# Patient Record
Sex: FEMALE | Race: WHITE | NOT HISPANIC OR LATINO | URBAN - METROPOLITAN AREA
[De-identification: names, ages, dates, MRNs, and addresses within clinical notes are randomized per-mention and may not be internally consistent; named-entity substitution may affect disease eponyms.]

---

## 2017-09-23 ENCOUNTER — OUTPATIENT (OUTPATIENT)
Dept: OUTPATIENT SERVICES | Facility: HOSPITAL | Age: 52
LOS: 1 days | End: 2017-09-23
Payer: COMMERCIAL

## 2017-09-23 ENCOUNTER — APPOINTMENT (OUTPATIENT)
Dept: RADIOLOGY | Facility: CLINIC | Age: 52
End: 2017-09-23
Payer: COMMERCIAL

## 2017-09-23 DIAGNOSIS — Z00.8 ENCOUNTER FOR OTHER GENERAL EXAMINATION: ICD-10-CM

## 2017-09-23 PROCEDURE — 71020: CPT | Mod: 26

## 2017-09-23 PROCEDURE — 71046 X-RAY EXAM CHEST 2 VIEWS: CPT

## 2018-06-13 ENCOUNTER — RESULT REVIEW (OUTPATIENT)
Age: 53
End: 2018-06-13

## 2019-05-04 ENCOUNTER — APPOINTMENT (OUTPATIENT)
Dept: CT IMAGING | Facility: CLINIC | Age: 54
End: 2019-05-04
Payer: COMMERCIAL

## 2019-05-04 ENCOUNTER — OUTPATIENT (OUTPATIENT)
Dept: OUTPATIENT SERVICES | Facility: HOSPITAL | Age: 54
LOS: 1 days | End: 2019-05-04
Payer: COMMERCIAL

## 2019-05-04 DIAGNOSIS — Z00.8 ENCOUNTER FOR OTHER GENERAL EXAMINATION: ICD-10-CM

## 2019-05-04 PROCEDURE — 71250 CT THORAX DX C-: CPT | Mod: 26

## 2019-05-04 PROCEDURE — 71250 CT THORAX DX C-: CPT

## 2019-09-02 ENCOUNTER — EMERGENCY (EMERGENCY)
Facility: HOSPITAL | Age: 54
LOS: 0 days | Discharge: HOME | End: 2019-09-02
Attending: EMERGENCY MEDICINE
Payer: COMMERCIAL

## 2019-09-02 VITALS
SYSTOLIC BLOOD PRESSURE: 142 MMHG | RESPIRATION RATE: 18 BRPM | OXYGEN SATURATION: 100 % | HEART RATE: 72 BPM | WEIGHT: 110.01 LBS | DIASTOLIC BLOOD PRESSURE: 83 MMHG | TEMPERATURE: 98 F | HEIGHT: 63 IN

## 2019-09-02 DIAGNOSIS — Z88.0 ALLERGY STATUS TO PENICILLIN: ICD-10-CM

## 2019-09-02 DIAGNOSIS — S00.33XA CONTUSION OF NOSE, INITIAL ENCOUNTER: ICD-10-CM
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
## 2019-09-02 DIAGNOSIS — S00.12XA CONTUSION OF LEFT EYELID AND PERIOCULAR AREA, INITIAL ENCOUNTER: ICD-10-CM

## 2019-09-02 DIAGNOSIS — S00.83XA CONTUSION OF OTHER PART OF HEAD, INITIAL ENCOUNTER: ICD-10-CM

## 2019-09-02 DIAGNOSIS — S01.112A LACERATION WITHOUT FOREIGN BODY OF LEFT EYELID AND PERIOCULAR AREA, INITIAL ENCOUNTER: ICD-10-CM

## 2019-09-02 DIAGNOSIS — M81.0 AGE-RELATED OSTEOPOROSIS WITHOUT CURRENT PATHOLOGICAL FRACTURE: ICD-10-CM

## 2019-09-02 DIAGNOSIS — Z88.1 ALLERGY STATUS TO OTHER ANTIBIOTIC AGENTS STATUS: ICD-10-CM

## 2019-09-02 PROCEDURE — 99283 EMERGENCY DEPT VISIT LOW MDM: CPT

## 2019-09-02 PROCEDURE — 90715 TDAP VACCINE 7 YRS/> IM: CPT

## 2019-09-02 PROCEDURE — 90471 IMMUNIZATION ADMIN: CPT

## 2019-09-02 PROCEDURE — 99283 EMERGENCY DEPT VISIT LOW MDM: CPT | Mod: 25

## 2019-09-02 RX ORDER — TETANUS TOXOID, REDUCED DIPHTHERIA TOXOID AND ACELLULAR PERTUSSIS VACCINE, ADSORBED 5; 2.5; 8; 8; 2.5 [IU]/.5ML; [IU]/.5ML; UG/.5ML; UG/.5ML; UG/.5ML
0.5 SUSPENSION INTRAMUSCULAR ONCE
Refills: 0 | Status: COMPLETED | OUTPATIENT
Start: 2019-09-02 | End: 2019-09-02

## 2019-09-02 RX ADMIN — TETANUS TOXOID, REDUCED DIPHTHERIA TOXOID AND ACELLULAR PERTUSSIS VACCINE, ADSORBED 0.5 MILLILITER(S): 5; 2.5; 8; 8; 2.5 SUSPENSION INTRAMUSCULAR at 15:23

## 2019-09-02 NOTE — ED STATDOCS - ATTENDING CONTRIBUTION TO CARE
I, Nicolas Flannery MD,  performed the initial face to face bedside interview with this patient regarding history of present illness, review of symptoms and relevant past medical, social and family history.  I completed an independent physical examination.  I was the initial provider who evaluated this patient. I have signed out the follow up of any pending tests (i.e. labs, radiological studies) to the ACP.  I have communicated the patient’s plan of care and disposition with the ACP.

## 2019-09-02 NOTE — ED ADULT TRIAGE NOTE - CHIEF COMPLAINT QUOTE
Patient comes to ED for hitting face while using an abdominal roller on the floor. pt denies LOC. pt denies blood thinners. pt denies any pain. pt has redness and ecchymosis to left eye

## 2019-09-02 NOTE — ED STATDOCS - OBJECTIVE STATEMENT
Pertinent HPI/PMH/PSH/FHx/SHx and Review of Systems contained within  HPI: 53 y/o female p/w CC ecchymosis around and swelling around left eye. Pt states she was using an abdominal roller last night and slipped and hit her face against the floor. Pt was wearing her glasses. Pt denies pain but came to ED to make sure nothing was fractured. Pt has been applying ice to area. Denies blurry vision. No LOC, no vomiting. Unknown last tetanus.   PMH/PSH relevant for: osteoporosis   ROS negative for: fever, Chest pain, SOB, Nausea, vomiting, diarrhea, abdominal pain, dysuria    FamilyHx and SocialHx not otherwise contributory  PMD: Dr. Teague.

## 2019-09-02 NOTE — ED STATDOCS - CLINICAL SUMMARY MEDICAL DECISION MAKING FREE TEXT BOX
Will update tetanus and discharge. Unlikely any facial fracture. Some nasal TTP, good alignment. Offered CT max/face to confirm. Pt prefers not to have CT done. Will D/C and PMD follow up.

## 2019-09-02 NOTE — ED STATDOCS - NS ED ROS FT
Review of Systems:  	•	CONSTITUTIONAL: no fever  	•	SKIN: +left periorbital ecchymosis   	•	RESPIRATORY: no shortness of breath  	•	CARDIAC: no chest pain, no palpitations  	•	GI:  no abd pain, no nausea, no vomiting, no diarrhea  	•	GENITO-URINARY:  no dysuria; no hematuria    	•	MUSCULOSKELETAL:  no back pain  	•	NEUROLOGIC: no weakness  	•	ALLERGY: no rhinitis  	•	PSYCHIATRIC: no anxiety

## 2019-09-02 NOTE — ED STATDOCS - CARE PROVIDER_API CALL
Mumtaz Gibson)  Otolaryngology  [address] [place] [address]  Phone: [phone]  Fax: [phone]  Follow Up Time:

## 2019-09-02 NOTE — ED STATDOCS - PATIENT PORTAL LINK FT
You can access the FollowMyHealth Patient Portal offered by [facility] by registering at the following website: [email or web address]. By joining [employer]’s FollowMyHealth portal, you will also be able to view your health information using other applications (apps) compatible with our system.

## 2019-09-02 NOTE — ED STATDOCS - PHYSICAL EXAMINATION
*GEN: NAD; well appearing; A+O x3   *HEAD: NC/AT   *EYES/NOSE: PERRL & EOMI b/l, +contusion left periorbital region, tenderness to nasal cartilage   *THROAT: airway patent, moist mucous membranes  *NECK: Neck supple, no masses  *PULMONARY: CTA b/l, symmetric breath sounds.   *CARDIAC: s1s2, regular rhythm, no Murmur  *ABDOMEN:  ND, NT, soft, no guarding, no rebound, no masses   *BACK: no CVA tenderness, Normal  spine   *EXTREMITIES: symmetric pulses, 2+ dp & radial pulses, capillary refill < 2 seconds, no cyanosis, no edema   *SKIN: no rash +bruising around left eye    *NEUROLOGIC: alert, CN 2-12 intact, moves all 4 extremities, full active & passive ROM in all extremities, normal baseline gait  *PSYCH: insight and judgment nl, memory nl, affect nl, thought nl

## 2019-09-02 NOTE — ED STATDOCS - PROGRESS NOTE DETAILS
RAYNA Mcgee:   Patient has been seen, evaluated and orders have been written by the attending in intake. Patient is stable.  I will follow up the results of orders written and I will continue to evaluate/observe the patient.   Willow Mcgee PA-C Will dc home s/p tetanus.  Neg facial deformity.  No bony tenderness to palp.  Neg crepitus.  Neg Septal hematoma.  F/U with ENT for possible non-displaced nasal fracture.  1cm superficial lac to upper eyelid has been open for nearly 24 hours.  Will not close now.  Pt has applied Bacitracin.  Cont BID.  Terrence.  Willow Mcgee PA-C

## 2019-09-02 NOTE — ED STATDOCS - NS ED MD DISPO DISCHARGE CCDA
Pt states he does not want to be admitted and wants to go home. NAD.    Patient/Caregiver provided printed discharge information.

## 2019-09-02 NOTE — ED STATDOCS - NSFOLLOWUPINSTRUCTIONS_ED_ALL_ED_FT
Nasal Contusion    WHAT YOU NEED TO KNOW:    A nasal contusion is a bruise that appears on your nose after an injury. A bruise happens when small blood vessels tear but skin does not. When blood vessels tear, blood leaks into surrounding tissue, such as soft tissue or muscle. You may develop swelling and bruising around your eyes and cheeks.     DISCHARGE INSTRUCTIONS:    Return to the emergency department if:     You have a fever.      You cannot breathe through your nostrils.       You have bleeding from your nose that does not stop when you apply pressure to your nose.       You notice a change in the shape of your nose.       You have watery, clear fluid draining from your nose.       You have tingling or numbness in or near the injured area.      You have any changes in your vision.    Contact your healthcare provider if:     Your symptoms do not improve with treatment.      You have questions or concerns about your condition or care.    Medicines:     Acetaminophen decreases pain. It is available without a doctor's order. Ask how much to take and how often to take it. Follow directions. Acetaminophen can cause liver damage if not taken correctly.      Take your medicine as directed. Contact your healthcare provider if you think your medicine is not helping or if you have side effects. Tell him of her if you are allergic to any medicine. Keep a list of the medicines, vitamins, and herbs you take. Include the amounts, and when and why you take them. Bring the list or the pill bottles to follow-up visits. Carry your medicine list with you in case of an emergency.    Ice: Apply ice on your bruise for 15 to 20 minutes every hour or as directed. Use an ice pack, or put crushed ice in a plastic bag. Cover it with a towel. Ice helps decrease tissue damage and decreases swelling and pain.    Elevation: Sleep with your head elevated to help decrease swelling.     Help your contusion heal: Do not massage the area or put heating pads or other warming devices on the bruise right after your injury. Heat and massage may slow the healing of the area.    Follow up with your healthcare provider as directed: You may need to return within a week to have your injury checked again. Write down any questions you have so you remember to ask them in your follow-up visits.    Prevent a nasal contusion:     Use safety belts and child restraints.       Use safety helmets when you ride a bicycle or motorcycle.       Use a mouth and face guard during sports.     Laceration    WHAT YOU NEED TO KNOW:    A laceration is an injury to the skin and the soft tissue underneath it. Lacerations happen when you are cut or hit by something. They can happen anywhere on the body.     DISCHARGE INSTRUCTIONS:    Return to the emergency department if:     You have heavy bleeding or bleeding that does not stop after 10 minutes of holding firm, direct pressure over the wound.       Your wound opens up.     Contact your healthcare provider if:     You have a fever or chills.       Your laceration is red, warm, or swollen.      You have red streaks on your skin coming from your wound.      You have white or yellow drainage from the wound that smells bad.      You have pain that gets worse, even after treatment.       You have questions or concerns about your condition or care.     Medicines:     Prescription pain medicine may be given. Ask how to take this medicine safely.       Antibiotics help treat or prevent a bacterial infection.       Take your medicine as directed. Contact your healthcare provider if you think your medicine is not helping or if you have side effects. Tell him or her if you are allergic to any medicine. Keep a list of the medicines, vitamins, and herbs you take. Include the amounts, and when and why you take them. Bring the list or the pill bottles to follow-up visits. Carry your medicine list with you in case of an emergency.    Care for your wound as directed:     Do not get your wound wet until your healthcare provider says it is okay. Do not soak your wound in water. Do not go swimming until your healthcare provider says it is okay. Carefully wash the wound with soap and water. Gently pat the area dry or allow it to air dry.       Change your bandages when they get wet, dirty, or after washing. Apply new, clean bandages as directed. Do not apply elastic bandages or tape too tight. Do not put powders or lotions over your incision.       Apply antibiotic ointment as directed. Your healthcare provider may give you antibiotic ointment to put over your wound if you have stitches. If you have strips of tape over your incision, let them dry up and fall off on their own. If they do not fall off within 14 days, gently remove them. If you have glue over your wound, do not remove or pick at it. If your glue comes off, do not replace it with glue that you have at home.       Check your wound every day for signs of infection such as swelling, redness, or pus.     Self-care:     Apply ice on your wound for 15 to 20 minutes every hour or as directed. Use an ice pack, or put crushed ice in a plastic bag. Cover it with a towel. Ice helps prevent tissue damage and decreases swelling and pain.      Use a splint as directed. A splint will decrease movement and stress on your wound. It may help it heal faster. A splint may be used for lacerations over joints or areas of your body that bend. Ask your healthcare provider how to apply and remove a splint.       Decrease scarring of your wound by applying ointments as directed. Do not apply ointments until your healthcare provider says it is okay. You may need to wait until your wound is healed. Ask which ointment to buy and how often to use it. After your wound is healed, use sunscreen over the area when you are out in the sun. You should do this for at least 6 months to 1 year after your injury.     Follow up with your healthcare provider as directed: You may need to follow up in 24 to 48 hours to have your wound checked for infection. You will need to return in 3 to 14 days if you have stitches or staples so they can be removed. Care for your wound as directed to prevent infection and help it heal. Write down your questions so you remember to ask them during your visits.       © Copyright [employer] 2019 All illustrations and images included in CareNotes are the copyrighted property of [employer] or [facility].      back to top                      © Copyright [employer] 2019

## 2019-09-02 NOTE — ED STATDOCS - CARE PLAN
Principal Discharge DX:	Facial contusion, initial encounter  Secondary Diagnosis:	Left eyelid laceration, initial encounter  Secondary Diagnosis:	Contusion of nose, initial encounter

## 2019-12-09 PROBLEM — M81.0 AGE-RELATED OSTEOPOROSIS WITHOUT CURRENT PATHOLOGICAL FRACTURE: Chronic | Status: ACTIVE | Noted: 2019-09-13

## 2020-01-17 ENCOUNTER — APPOINTMENT (OUTPATIENT)
Dept: OTOLARYNGOLOGY | Facility: CLINIC | Age: 55
End: 2020-01-17
